# Patient Record
Sex: FEMALE
[De-identification: names, ages, dates, MRNs, and addresses within clinical notes are randomized per-mention and may not be internally consistent; named-entity substitution may affect disease eponyms.]

---

## 2018-03-15 ENCOUNTER — HOSPITAL ENCOUNTER (OUTPATIENT)
Dept: HOSPITAL 88 - MAMMO | Age: 48
End: 2018-03-15
Attending: INTERNAL MEDICINE
Payer: COMMERCIAL

## 2018-03-15 DIAGNOSIS — Z12.31: Primary | ICD-10-CM

## 2018-03-15 PROCEDURE — 77067 SCR MAMMO BI INCL CAD: CPT

## 2018-03-16 NOTE — DIAGNOSTIC IMAGING REPORT
#[id number] - [id number]

#BILATERAL FIRST EVER DIGITAL SCREENING MAMMOGRAM WITH CAD: 3/15/2018

CLINICAL: Routine screening.  Baseline exam.  



No prior exams were available for comparison.  Current study contains 4 films.  

The tissue of both breasts is heterogeneously dense. This may lower the sensitivity of mammography. 

 

Current study was also evaluated with a Computer Aided Detection (CAD) system.  

There are benign calcifications in both breasts.  

No significant masses, calcifications, or other findings are seen in either breast.  



IMPRESSION: BENIGN

There is no mammographic evidence of malignancy.  A 1 year screening mammogram is recommended. 

The patient will be notified by letter of the results.  





Mario lara/sabrina:3/16/2018 11:18:21  



Imaging Technologist: Francisca FARNSWORTH)(M), [facility]

letter sent: Normal Exam  

Mammogram BI-RADS: 2 Benign